# Patient Record
(demographics unavailable — no encounter records)

---

## 2025-07-18 NOTE — DISCUSSION/SUMMARY
[FreeTextEntry1] : Mr. Styles is a 63 year old man with chronic dizziness.  He has episodes of feeling fullness in his head as well as lightheadedness/floating and has had episodes where the world tilts to the left. There is no positional influence.  Symptoms are not classic for persistent perceptual positional dizziness since there is no clear positional component. He has been diagnosed with cervicogenic dizziness.  -Continue vestibular therapy -Avoid high velocity chiropractic manipulations of the neck due to risk of dissection. -Can continue sporadic use of benzodiazepines. Can change to diazepam if needed. -Will get updated MRI cervical spine. -Adequate hydration  f/u after MRI cervical spine

## 2025-07-18 NOTE — CONSULT LETTER
[Dear  ___] : Dear  [unfilled], [Consult Letter:] : I had the pleasure of evaluating your patient, [unfilled]. [Please see my note below.] : Please see my note below. [Consult Closing:] : Thank you very much for allowing me to participate in the care of this patient.  If you have any questions, please do not hesitate to contact me. [FreeTextEntry2] : Bakari Linn [FreeTextEntry3] : Sincerely,   Swathi Acosta MD Diplomate, [employer]iat[employer] and Neu[employer] Board Certified in the Subspecialty of Clinical Neurophysiology Board Certified in the Subspecialty of Sleep Medicine Board Certified in the Subspecialty of Epilepsy

## 2025-07-18 NOTE — PHYSICAL EXAM
[FreeTextEntry1] : Examination: Constitutional: normal, no apparent distress Eyes: normal conjunctiva b/l, no ptosis, visual fields full Respiratory: no respiratory distress, normal effort, normal auscultation Cardiovascular: normal rate, rhythm, no murmurs Neck: supple, no masses Vascular: carotids normal Skin: normal color, no rashes Psych: normal mood, affect  Neurological: Memory: normal memory, oriented to person, place, time Language intact/no aphasia Cranial Nerves: II-XII intact, Pupils equally round and reactive to light, ocular muscles/movements intact, no ptosis, no facial weakness, tongue protrudes normally in the midline,  Motor: normal tone, no pronator drift, full strength in all four extremities in the proximal and distal muscle groups Coordination: Fine motor movements intact, rapid alternating movements intact, finger to nose intact bilaterally Sensory: intact to light touch, vibration, joint position sense, negative Romberg examination DTRs: symmetric, 1+ in b/l triceps, 1+ in b/l biceps, 1+ in b/l brachioradialis, 2+ in bilateral patellars, absent in bilateral Achilles, Babinskis negative bilaterally Gait: narrow based, steady, able to walk on heels, toes, tandem gait
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
## 2025-07-18 NOTE — DATA REVIEWED
[de-identified] : MRI brain with and without contrast 8/13/21: Minimal small vessel ischemic changes  [de-identified] : [facility] cervical spine 1/30/25: Straighening of normal lordotic curvature cervical spine possibly due to spasm. Moderate disc degeneration at the level of C3-4, C5-6 and C6-7. MRA neck 4/9/25: No evidence of hemodynamically No significant stenosis at either carotid bifurcation. Both vertebral arteries are patent with right dominant system No alteration in configuration identified comparing flexion and extension views to neutral position Asymmetric anterior positioning of the proximal left vertebral artery relative to the right corresponding to provided history of aberrant left vertebral artery course on previous outside MRA from 2010